# Patient Record
(demographics unavailable — no encounter records)

---

## 2024-10-08 NOTE — HISTORY OF PRESENT ILLNESS
[FreeTextEntry1] : Patient is a very pleasant 43 -year-old female with history of hypothyroidism PCOS, here for endocrinology followup visit.     She is currently on levothyroxine 88 mcg once daily.  She denies any clinical symptoms of hypothyroidism including hair loss, constipation.   Regarding polycystic ovarian disease, she has 2 children, both without assistive fertility, age 14 (girl) and 9 (boy)   Type 2 diabetes, currently on metformin and Ozempic 1.0mg once weekly, mild nausea but otherwise doing well on it.   She states that diet wise no significantly changed.  Breakfast: small oatmeal or eggs  Lunch: if she gets a chance for lunch, vegetable, dinner Dinner: Sometimes a vegetable shake, sometimes chicken, salad.    She reports difficulty with weight loss despite being on Ozempic. She has been exercising.

## 2024-10-08 NOTE — ASSESSMENT
[FreeTextEntry1] : Patient is a 43-year-old woman with history of PCOS, hypothyroidism, vitamin D deficiency, prediabetes, here for endocrinology follow-up.   1.  Hypothyroidism Currently on levothyroxine 88 mcg once daily Clinically hypothyroid, complaining of fatigue. Check TFT and adjust dosage as needed. She has two children, 9-year-old (boy)  and 14 year old. (older one is a girl)  2.  Polycystic ovarian syndrome Given the [place] criteria she meets 2/3,  she has menstrual irregularity which now improved.  DM being managed.  Lipid profile to be checked.   3. Vitamin D deficiency Check Vitamin D level  She had completed a course of vitamin D around November 2021.  4.  Type 2 DM Metformin 1000mg bid  Ozempic 2.0mg once weekly  Recommending annual ophthalmology and podiatry visit ck exercises ophthalmology and podiatry visit.  5.  Hyperlipidemia Reporting some upset stomach with Crestor 5 mg every other day Recommend changing it to Pitavastatin Calcium 1 MG Oral Tablet (Livalo); once daily  Follow-up in 6 months